# Patient Record
Sex: MALE
[De-identification: names, ages, dates, MRNs, and addresses within clinical notes are randomized per-mention and may not be internally consistent; named-entity substitution may affect disease eponyms.]

---

## 2018-02-26 ENCOUNTER — HOSPITAL ENCOUNTER (INPATIENT)
Dept: HOSPITAL 88 - ER | Age: 18
LOS: 3 days | Discharge: HOME | DRG: 293 | End: 2018-03-01
Attending: INTERNAL MEDICINE | Admitting: INTERNAL MEDICINE
Payer: COMMERCIAL

## 2018-02-26 VITALS — BODY MASS INDEX: 48.24 KG/M2 | HEIGHT: 64 IN | WEIGHT: 282.56 LBS

## 2018-02-26 VITALS — SYSTOLIC BLOOD PRESSURE: 155 MMHG | DIASTOLIC BLOOD PRESSURE: 90 MMHG

## 2018-02-26 VITALS — DIASTOLIC BLOOD PRESSURE: 90 MMHG | SYSTOLIC BLOOD PRESSURE: 155 MMHG

## 2018-02-26 VITALS — SYSTOLIC BLOOD PRESSURE: 129 MMHG | DIASTOLIC BLOOD PRESSURE: 59 MMHG

## 2018-02-26 DIAGNOSIS — Z99.81: ICD-10-CM

## 2018-02-26 DIAGNOSIS — Z87.74: ICD-10-CM

## 2018-02-26 DIAGNOSIS — I50.21: Primary | ICD-10-CM

## 2018-02-26 DIAGNOSIS — R07.89: ICD-10-CM

## 2018-02-26 DIAGNOSIS — R09.02: ICD-10-CM

## 2018-02-26 DIAGNOSIS — E66.01: ICD-10-CM

## 2018-02-26 LAB
ALBUMIN SERPL-MCNC: 4.3 G/DL (ref 3.5–5)
ALBUMIN/GLOB SERPL: 1.1 {RATIO} (ref 0.8–2)
ALP SERPL-CCNC: 59 IU/L (ref 40–150)
ALT SERPL-CCNC: 67 IU/L (ref 0–55)
AMPHETAMINES UR QL SCN>1000 NG/ML: NEGATIVE
ANION GAP SERPL CALC-SCNC: 13.1 MMOL/L (ref 8–16)
BASOPHILS # BLD AUTO: 0.1 10*3/UL (ref 0–0.1)
BASOPHILS NFR BLD AUTO: 0.6 % (ref 0–1)
BENZODIAZ UR QL SCN: NEGATIVE
BUN SERPL-MCNC: 14 MG/DL (ref 7–26)
BUN/CREAT SERPL: 16 (ref 6–25)
CALCIUM SERPL-MCNC: 9.5 MG/DL (ref 8.4–10.2)
CHLORIDE SERPL-SCNC: 105 MMOL/L (ref 98–107)
CK MB SERPL-MCNC: 0.7 NG/ML (ref 0–5)
CK SERPL-CCNC: 66 IU/L (ref 30–200)
CO2 SERPL-SCNC: 23 MMOL/L (ref 22–29)
DEPRECATED APTT PLAS QN: 26.9 SECONDS (ref 23.8–35.5)
DEPRECATED INR PLAS: 1.17
DEPRECATED NEUTROPHILS # BLD AUTO: 5.5 10*3/UL (ref 2.1–6.9)
EGFRCR SERPLBLD CKD-EPI 2021: > 60 ML/MIN (ref 60–?)
EOSINOPHIL # BLD AUTO: 0.4 10*3/UL (ref 0–0.4)
EOSINOPHIL NFR BLD AUTO: 4.8 % (ref 0–6)
ERYTHROCYTE [DISTWIDTH] IN CORD BLOOD: 13.8 % (ref 11.7–14.4)
GLOBULIN PLAS-MCNC: 3.8 G/DL (ref 2.3–3.5)
GLUCOSE SERPLBLD-MCNC: 114 MG/DL (ref 74–118)
HCT VFR BLD AUTO: 49.7 % (ref 38.2–49.6)
HGB BLD-MCNC: 16.9 G/DL (ref 14–18)
LYMPHOCYTES # BLD: 1.2 10*3/UL (ref 1–3.2)
LYMPHOCYTES NFR BLD AUTO: 16 % (ref 18–39.1)
MCH RBC QN AUTO: 28.4 PG (ref 28–32)
MCHC RBC AUTO-ENTMCNC: 34 G/DL (ref 31–35)
MCV RBC AUTO: 83.4 FL (ref 81–99)
MONOCYTES # BLD AUTO: 0.6 10*3/UL (ref 0.2–0.8)
MONOCYTES NFR BLD AUTO: 7.7 % (ref 4.4–11.3)
NEUTS SEG NFR BLD AUTO: 70.5 % (ref 38.7–80)
PCP UR QL SCN: NEGATIVE
PLATELET # BLD AUTO: 201 X10E3/UL (ref 140–360)
POTASSIUM SERPL-SCNC: 4.1 MMOL/L (ref 3.5–5.1)
PROTHROMBIN TIME: 14 SECONDS (ref 11.9–14.5)
RBC # BLD AUTO: 5.96 X10E6/UL (ref 4.3–5.7)
SODIUM SERPL-SCNC: 137 MMOL/L (ref 136–145)

## 2018-02-26 PROCEDURE — 93005 ELECTROCARDIOGRAM TRACING: CPT

## 2018-02-26 PROCEDURE — 85610 PROTHROMBIN TIME: CPT

## 2018-02-26 PROCEDURE — 82550 ASSAY OF CK (CPK): CPT

## 2018-02-26 PROCEDURE — 99284 EMERGENCY DEPT VISIT MOD MDM: CPT

## 2018-02-26 PROCEDURE — 80048 BASIC METABOLIC PNL TOTAL CA: CPT

## 2018-02-26 PROCEDURE — 85025 COMPLETE CBC W/AUTO DIFF WBC: CPT

## 2018-02-26 PROCEDURE — 93306 TTE W/DOPPLER COMPLETE: CPT

## 2018-02-26 PROCEDURE — 82553 CREATINE MB FRACTION: CPT

## 2018-02-26 PROCEDURE — 71045 X-RAY EXAM CHEST 1 VIEW: CPT

## 2018-02-26 PROCEDURE — 83880 ASSAY OF NATRIURETIC PEPTIDE: CPT

## 2018-02-26 PROCEDURE — 85379 FIBRIN DEGRADATION QUANT: CPT

## 2018-02-26 PROCEDURE — 36415 COLL VENOUS BLD VENIPUNCTURE: CPT

## 2018-02-26 PROCEDURE — 80307 DRUG TEST PRSMV CHEM ANLYZR: CPT

## 2018-02-26 PROCEDURE — 87400 INFLUENZA A/B EACH AG IA: CPT

## 2018-02-26 PROCEDURE — 85730 THROMBOPLASTIN TIME PARTIAL: CPT

## 2018-02-26 PROCEDURE — 84484 ASSAY OF TROPONIN QUANT: CPT

## 2018-02-26 PROCEDURE — 80053 COMPREHEN METABOLIC PANEL: CPT

## 2018-02-26 RX ADMIN — FUROSEMIDE SCH MG: 10 INJECTION, SOLUTION INTRAMUSCULAR; INTRAVENOUS at 21:55

## 2018-02-26 RX ADMIN — FUROSEMIDE SCH MG: 10 INJECTION, SOLUTION INTRAMUSCULAR; INTRAVENOUS at 12:55

## 2018-02-26 RX ADMIN — FUROSEMIDE SCH MG: 10 INJECTION, SOLUTION INTRAMUSCULAR; INTRAVENOUS at 21:00

## 2018-02-26 NOTE — XMS REPORT
Patient Summary Document

 Created on: 2018



NUNO VIEYRA

External Reference #: [id number]

: 2000

Sex: Male



Demographics







 Address  [address] [place] [address]  [id number]

 

 Home Phone  [phone]

 

 Preferred Language  Unknown

 

 Marital Status  Unknown

 

 Rastafarian Affiliation  Unknown

 

 Race  Unknown

 

 Additional Race(s)  

 

 Ethnic Group  Unknown





Author







 [facility]  [facility]

 

 [facility]

 

 Address  Unknown

 

 Phone  Unavailable







Care Team Providers







 Care Team Member Name  Role  Phone

 

 YARA FINNEGAN  Unavailable  Unavailable







Problems

This patient has no known problems.



Allergies, Adverse Reactions, Alerts

This patient has no known allergies or adverse reactions.



Medications

This patient has no known medications.



Results







 Test Description  Test Time  Test Comments  Text Results  Atomic Results  
Result Comments









 CT BRAIN WO            [place] [address]      Patient Name: NUNO VIEYRA   MR #: [id number]    : 2000 Age/Sex: 16/M  Acct #: [id number] 
Req #: [id number]  Adm Physician:     Ordered by: YARA FINNEGAN MD  Report #: 
[id number]   Location: ER  Room/Bed:     ________________________________________
___________________________________________________________    Procedure: 0916-
0010 CT/CT BRAIN WO  Exam Date:                             Exam Time:        
REPORT STATUS: Signed    Examination: CT BRAIN WITHOUT CONTRAST      History:
Headache.   Comparison studies:None      Technique:   Axial images were 
obtained from the skull base to the vertex.   Coronal and sagittal images 
reconstructed from the axial data.   Intravenous contrast: None      Findings: 
     Scalp: No abnormalities.   Bones: No fractures, blastic or lytic lesions. 
     Brain sulci: Appropriate for age.   Ventricles: Normal in size and 
configuration. No hydrocephalus.      Extra-axial space:   No abnormalities.   
   Parenchyma:    No abnormal densities.    No masses, hemorrhage, acute or 
chronic vascular insults.      Sellar/suprasellar region: No abnormalities.   
Craniocervical junction: Patent foramen magnum. No Chiari one malformation.    
  Incidental findings:    None.      Impression:       No intracranial 
abnormalities.      Signed by: Dr. Eula Wing M.D. on 2017 6:29 PM 
       Dictated By: EULA THORNTON MD  Electronically Signed By: 
EULA THORNTON MD on 17  Transcribed By: GISSELLE on 1829       COPY TO:   YARA FINNEGAN MD           

 

 CHEST SINGLE (PORTABLE)            [place] [address]      Patient Name: NUNO VIEYRA   MR #: [id number]    : 2000 Age/Sex: 16/M  Acct #: 
[id number] Req #: [id number]  Adm Physician:     Ordered by: YARA FINNEGAN MD
  Report #: [id number]   Location: ER  Room/Bed:     ____________________________
_______________________________________________________________________    
Procedure: [id number] DX/CHEST SINGLE (PORTABLE)  Exam Date: 17           
                 Exam Time:        REPORT STATUS: Signed    EXAMINATION:  
CHEST SINGLE (PORTABLE)          INDICATION:      Bad head pain.      COMPARISON
:  None           FINDINGS:  AP view         TUBES and LINES:  None.      LUNGS
:  Lungs are not well inflated.  There is perihilar interstitial   opacities, 
consistent with interstitial edema.      PLEURA:  No pleural effusion or 
pneumothorax.      HEART AND MEDIASTINUM:  Cardiac size is moderately enlarged.
          BONES AND SOFT TISSUES:  No acute osseous lesion.  Vascular coils 
overlying the   right upper chest.      UPPER ABDOMEN: No free air under the 
diaphragm.          IMPRESSION:    Moderate cardiomegaly with interstitial 
edema.         Signed by: Dr. Rivera Hale M.D. on 2017 6:30 PM        
Dictated By: RIVERA HALE MD  Electronically Signed By: RIVERA HALE MD on 17  
Transcribed By: GISSELLE on 17       COPY TO:   YARA FINNEGAN MD

## 2018-02-26 NOTE — DIAGNOSTIC IMAGING REPORT
PROCEDURE:

A single AP view of the chest.

 

COMPARISON: Chest radiograph 9/16/2017

 

INDICATIONS:   SHORTNESS OF BREATH, CHEST PAIN

     

FINDINGS:

Lines/tubes:  None.

 

Lungs:  The lungs are not well inflated. Central hilar prominence with 

perihilar interstitial opacities.

 

Pleura:  There is no pleural effusion or pneumothorax.

 

Heart and mediastinum: Stable moderate enlargement of the cardiac 

silhouette.

 

Bones:  No acute bony abnormality. Median sternotomy wires. Multiple 

coils project over the medial right mid-upper chest. 

 

IMPRESSION: 

Moderate cardiomegaly with interstitial edema. 

 

 

Dictated by:  Caleb Patrick M.D. on 2/26/2018 at 11:20     

Electronically approved by:  Caleb Patrick M.D. on 2/26/2018 at 11:20

## 2018-02-27 VITALS — SYSTOLIC BLOOD PRESSURE: 151 MMHG | DIASTOLIC BLOOD PRESSURE: 67 MMHG

## 2018-02-27 VITALS — DIASTOLIC BLOOD PRESSURE: 65 MMHG | SYSTOLIC BLOOD PRESSURE: 136 MMHG

## 2018-02-27 VITALS — SYSTOLIC BLOOD PRESSURE: 116 MMHG | DIASTOLIC BLOOD PRESSURE: 57 MMHG

## 2018-02-27 VITALS — DIASTOLIC BLOOD PRESSURE: 60 MMHG | SYSTOLIC BLOOD PRESSURE: 136 MMHG

## 2018-02-27 VITALS — SYSTOLIC BLOOD PRESSURE: 118 MMHG | DIASTOLIC BLOOD PRESSURE: 64 MMHG

## 2018-02-27 VITALS — SYSTOLIC BLOOD PRESSURE: 136 MMHG | DIASTOLIC BLOOD PRESSURE: 60 MMHG

## 2018-02-27 VITALS — SYSTOLIC BLOOD PRESSURE: 129 MMHG | DIASTOLIC BLOOD PRESSURE: 73 MMHG

## 2018-02-27 LAB
CK MB SERPL-MCNC: 0.5 NG/ML (ref 0–5)
CK MB SERPL-MCNC: 0.6 NG/ML (ref 0–5)
CK SERPL-CCNC: 57 IU/L (ref 30–200)
CK SERPL-CCNC: 59 IU/L (ref 30–200)

## 2018-02-27 RX ADMIN — FUROSEMIDE SCH MG: 10 INJECTION, SOLUTION INTRAMUSCULAR; INTRAVENOUS at 09:02

## 2018-02-27 RX ADMIN — FUROSEMIDE SCH MG: 10 INJECTION, SOLUTION INTRAMUSCULAR; INTRAVENOUS at 21:04

## 2018-02-27 NOTE — CONSULTATION
DATE OF CONSULTATION:  2018 



CARDIOLOGY CONSULTATION 



REQUESTING PHYSICIAN:  Dr. Darcie Bangura



REASON FOR CONSULTATION:  Congestive heart failure.



HISTORY OF PRESENT ILLNESS:  This is a 17-year-old man with history of 

tetralogy of Fallot, status post open repair as a  who presents with 

complaints of chest pain.  The patient reports he was in his usual state of 

health until yesterday morning when he began to feel very dizzy and felt 

like he was about to pass out.  He then had chest pain that he described as 

a chest tightness that was 6/10 in severity that was more severe than his 

usual that was not associated with shortness of breath, nausea or 

diaphoresis.  The pain did not radiate.  He did not notice any palpitations 

during this time.  Of note, he does endorse chronic dyspnea on exertion, 

but states that this is unchanged from his baseline. 



REVIEW OF SYSTEMS:  Negative except as per HPI.



PAST MEDICAL HISTORY:  Tetralogy of Fallot.



PAST SURGICAL HISTORY:  Surgical repair of tetralogy of Fallot.



ALLERGIES:  PLEASE SEE EMR.



MEDICATIONS:  Please see medication list.



SOCIAL HISTORY:  Denies tobacco, alcohol or illicit drugs.



FAMILY HISTORY:  Noncontributory.



PHYSICAL EXAM

VITALS:  Temperature 98.6 degrees, pulse 84, respiratory rate 18, blood 

pressure 129/73, oxygen saturation 98% on room air.

GENERAL:  Morbidly obese gentleman, in no acute distress.

HEENT:  Normocephalic, atraumatic.  Pupils are equal.  No scleral icterus. 

NECK:  Supple.  No thyromegaly or cervical lymphadenopathy.  No carotid 

bruit.

LUNGS:  Clear to auscultation bilaterally.  No wheezes or crackles.  

CARDIOVASCULAR:  Normal rate, regular rhythm.  No murmur.  Normal S1 and 

S2.  

ABDOMEN:  Soft, nontender. 

EXTREMITIES:  No edema.  



Chest x-ray:  Moderate cardiomegaly with interstitial edema.



Echocardiogram was extremely technically difficult.  Left ventricle 

appeared normal in size with severely impaired LV function with estimated 

LVEF of 20% to 25%.  There was pseudonormal LV filling and appeared to be 

right to left VSD present.



LABS:  Sodium 137, potassium 4.1, chloride 105, CO2 23, BUN 14, creatinine 

0.89.  Troponin less than 0.001.  BNP 16.4.  WBC 7.7, hemoglobin 16.9, 

hematocrit 49.7, platelets 201,000.  



IMPRESSION 

1. Chest pain.

2. Acute systolic heart failure.

3. Tetralogy Fallot, status post surgical repair. 



RECOMMENDATIONS:  Patient has ruled out for myocardial infarction.  

Continue diuresis.  Strict I's and O's as well as daily weights.  BNP can 

be falsely low in the setting of obesity.  I will attempt to obtain records 

regarding the patient's prior surgery.  He will attempt to determine which 

hospital his surgical repair was performed at.  Given concern of right to 

left shunting of the VSD, he will need left and right heart catheterization 

with shunt run as well as CARIDDA versus MRI for further evaluation of his 

cardiac anatomy, although these can be done as an outpatient.



Thank you for this consult.  We will continue to follow.









DD:  2018 20:31

DT:  2018 22:00

Job#:  [id number] ANDI

## 2018-02-28 VITALS — SYSTOLIC BLOOD PRESSURE: 130 MMHG | DIASTOLIC BLOOD PRESSURE: 88 MMHG

## 2018-02-28 VITALS — DIASTOLIC BLOOD PRESSURE: 62 MMHG | SYSTOLIC BLOOD PRESSURE: 106 MMHG

## 2018-02-28 VITALS — SYSTOLIC BLOOD PRESSURE: 115 MMHG | DIASTOLIC BLOOD PRESSURE: 61 MMHG

## 2018-02-28 VITALS — DIASTOLIC BLOOD PRESSURE: 78 MMHG | SYSTOLIC BLOOD PRESSURE: 119 MMHG

## 2018-02-28 VITALS — SYSTOLIC BLOOD PRESSURE: 133 MMHG | DIASTOLIC BLOOD PRESSURE: 96 MMHG

## 2018-02-28 VITALS — DIASTOLIC BLOOD PRESSURE: 75 MMHG | SYSTOLIC BLOOD PRESSURE: 135 MMHG

## 2018-02-28 VITALS — DIASTOLIC BLOOD PRESSURE: 88 MMHG | SYSTOLIC BLOOD PRESSURE: 130 MMHG

## 2018-02-28 VITALS — SYSTOLIC BLOOD PRESSURE: 119 MMHG | DIASTOLIC BLOOD PRESSURE: 78 MMHG

## 2018-02-28 LAB
ANION GAP SERPL CALC-SCNC: 20.8 MMOL/L (ref 8–16)
BUN SERPL-MCNC: 27 MG/DL (ref 7–26)
BUN/CREAT SERPL: 23 (ref 6–25)
CALCIUM SERPL-MCNC: 10.3 MG/DL (ref 8.4–10.2)
CHLORIDE SERPL-SCNC: 101 MMOL/L (ref 98–107)
CO2 SERPL-SCNC: 20 MMOL/L (ref 22–29)
EGFRCR SERPLBLD CKD-EPI 2021: > 60 ML/MIN (ref 60–?)
GLUCOSE SERPLBLD-MCNC: 136 MG/DL (ref 74–118)
POTASSIUM SERPL-SCNC: 3.8 MMOL/L (ref 3.5–5.1)
SODIUM SERPL-SCNC: 138 MMOL/L (ref 136–145)

## 2018-02-28 RX ADMIN — FUROSEMIDE SCH MG: 10 INJECTION, SOLUTION INTRAMUSCULAR; INTRAVENOUS at 21:00

## 2018-02-28 RX ADMIN — FUROSEMIDE SCH MG: 10 INJECTION, SOLUTION INTRAMUSCULAR; INTRAVENOUS at 09:37

## 2018-02-28 RX ADMIN — CARVEDILOL SCH MG: 3.12 TABLET, FILM COATED ORAL at 16:40

## 2018-02-28 NOTE — PROGRESS NOTE
DATE:  February 28, 2018 



SUBJECTIVE:  The patient denies chest pain.  He reports shortness of breath 

is at his baseline.  



OBJECTIVE  

VITAL SIGNS:  Temperature 96.5 degrees, pulse 57, respiratory rate 18, 

blood pressure 135/75, oxygen saturation 95% on room air.  

GENERAL:  Morbidly obese gentleman, in no acute distress, awake and alert, 

seated in a chair. 

LUNGS:  Clear to auscultation bilaterally.  No wheezes or crackles.  

CARDIOVASCULAR:  Normal rate, regular rhythm.  No murmur.  Normal S1 and 

S2.  

ABDOMEN:  Soft, nontender. 

EXTREMITIES:  No edema.  



CARDIAC MEDICATIONS:  Furosemide 40 mg IV q.12 h.



LABS:  None today.  



TELEMETRY:   Normal sinus rhythm. 



IMPRESSION 

1. Acute systolic heart failure.

2. Tetralogy of Fallot, status post surgical repair. 

3. Chest pain, resolved.  



RECOMMENDATIONS:  The patient ruled out for myocardial infarction.  

Continue diuretics.  Strict I's and O's as well as daily weights.  We have 

not been successful obtaining records regarding the patient's prior 

surgery.  The patient's family who was at bedside indicated he dropped his 

oxygen saturation with ambulation.  We will confirm this and arrange home 

oxygen evaluation if this is indeed the case.   Otherwise, he has been 

started on optimal medical therapy.  He will need to follow up as an 

outpatient for left and right heart catheterization to determine if the 

presence of VSD is confirmed.  This will be done with CARIDAD versus MRI as an 

outpatient. 





Thank you for this consult.  We will continue to follow. 









DD:  02/28/2018 15:28

DT:  02/28/2018 15:43

Job#:  [id number]

## 2018-03-01 VITALS — DIASTOLIC BLOOD PRESSURE: 78 MMHG | SYSTOLIC BLOOD PRESSURE: 128 MMHG

## 2018-03-01 VITALS — DIASTOLIC BLOOD PRESSURE: 80 MMHG | SYSTOLIC BLOOD PRESSURE: 162 MMHG

## 2018-03-01 VITALS — DIASTOLIC BLOOD PRESSURE: 62 MMHG | SYSTOLIC BLOOD PRESSURE: 111 MMHG

## 2018-03-01 VITALS — SYSTOLIC BLOOD PRESSURE: 137 MMHG | DIASTOLIC BLOOD PRESSURE: 90 MMHG

## 2018-03-01 VITALS — DIASTOLIC BLOOD PRESSURE: 58 MMHG | SYSTOLIC BLOOD PRESSURE: 107 MMHG

## 2018-03-01 RX ADMIN — CARVEDILOL SCH MG: 3.12 TABLET, FILM COATED ORAL at 10:16

## 2018-03-01 RX ADMIN — FUROSEMIDE SCH MG: 10 INJECTION, SOLUTION INTRAMUSCULAR; INTRAVENOUS at 09:45

## 2018-03-01 NOTE — PROGRESS NOTE
DATE:  March 01, 2018 



CARDIOLOGY PROGRESS NOTE



SUBJECTIVE:  The patient denies chest pain or shortness of breath. 



OBJECTIVE 

VITAL SIGNS:  Temperature 97.8 degrees, pulse 107, respiratory rate 18, 

blood pressure 136/81, and oxygen saturation 89% on 4 liters of nasal 

cannula. 

GENERAL:  Obese gentleman, in no acute distress, awake and alert. 

LUNGS:  Clear to auscultation bilaterally.  No wheezes or crackles.  

CARDIOVASCULAR:  Normal rate, regular rhythm.  No murmur.  Normal S1 and 

S2.  

ABDOMEN:  Soft and nontender. 

EXTREMITIES:  No edema. 



CARDIAC MEDICATIONS: 

1. Lisinopril 2.5 mg p.o. daily.

2. Carvedilol 3.125 mg p.o. b.i.d.

3. Furosemide 40 mg IV q.12 h.



LABS:  None today. 



TELEMETRY:  Normal sinus rhythm. 



IMPRESSION 

1. Acute systolic heart failure. 

2. Tetralogy of Fallot, status post surgical repair. 

3. Chest pain, resolved. 

4. Hypoxia. 



RECOMMENDATIONS:  The patient ruled out for myocardial infarction.  We will 

increase diuretics.  Strict I's and O's as well as daily weights.  We have 

not been able to obtain the patient's operative report from his surgical 

correction.  He will need follow up as an outpatient for further 

evaluation.  Patient requires home O2 as he was hypoxic with standing; 

however, we have not obtained insurance approval for home O2.  He may be 

discharged home once this occurs.  He will need outpatient right and left 

heart catheterization if he is confirmed to have a VSD still present.



Thank you for this consult.  We will continue to follow. 









DD:  03/01/2018 12:08

DT:  03/01/2018 12:32

Job#:  [id number] VAS

## 2018-03-01 NOTE — DISCHARGE SUMMARY
Mr. Vences is a 17-year-old man with history of congenital heart disease.  

Apparently, tetralogy of Fallot, who had heart surgery when he was a 

.  He came to the emergency room complaining of feeling dizzy, chest 

pain and shortness of breath.  Echocardiogram showed an ejection fraction 

of 20% to 25%.  He has been seen by cardiologist.  He is also hypoxemic on 

room air.  Home O2 was ordered for him.



PHYSICAL EXAMINATION 

GENERAL:  Today, he is awake and alert.  

VITALS:  Temperature is 97.8, blood pressure 107/58.

HEART:  Regular rate.

LUNGS:  Clear to auscultation. 

ABDOMEN:  Soft.



BLOOD WORK:  Potassium is 3.8, creatinine 1.17, glucose 136.  White cells 

7.77, hemoglobin 16.9, hematocrit 49.7.  



DISCHARGE DIAGNOSES 

1.  Chest pain, resolved.

2.  History of tetralogy of Fallot, status post surgery.

3.  Acute systolic congestive heart failure. 

4.  Hypoxemia. 



PLAN:  At the present time, is to continue to get records from prior 

admission.  Go home with O2.  Continue medications.  He is going to need 

left and right heart catheterization as an outpatient.  The plan is to 

discharge him home if it is okay with cardiologist.  Please see home 

medication reconciliation list.  He needs followup with me in 1 week.  He 

is to call me or come 

back to the emergency room if any recurrent problems.  All of this was 

discussed with the patient.  All questions were answered to satisfaction.  



 



 _________________________________

CAROLINA LAGOS MD



DD:  2018 09:06

DT:  2018 10:51

Job#:  [id number]